# Patient Record
Sex: MALE | Race: WHITE | NOT HISPANIC OR LATINO | URBAN - METROPOLITAN AREA
[De-identification: names, ages, dates, MRNs, and addresses within clinical notes are randomized per-mention and may not be internally consistent; named-entity substitution may affect disease eponyms.]

---

## 2018-11-27 ENCOUNTER — OUTPATIENT (OUTPATIENT)
Dept: INPATIENT UNIT | Facility: HOSPITAL | Age: 73
LOS: 1 days | Discharge: HOME | End: 2018-11-27
Payer: MEDICARE

## 2018-11-27 VITALS
DIASTOLIC BLOOD PRESSURE: 87 MMHG | TEMPERATURE: 99 F | HEART RATE: 62 BPM | SYSTOLIC BLOOD PRESSURE: 153 MMHG | RESPIRATION RATE: 16 BRPM

## 2018-11-27 DIAGNOSIS — Z98.890 OTHER SPECIFIED POSTPROCEDURAL STATES: Chronic | ICD-10-CM

## 2018-11-27 DIAGNOSIS — E03.9 HYPOTHYROIDISM, UNSPECIFIED: ICD-10-CM

## 2018-11-27 DIAGNOSIS — I10 ESSENTIAL (PRIMARY) HYPERTENSION: ICD-10-CM

## 2018-11-27 DIAGNOSIS — Z85.850 PERSONAL HISTORY OF MALIGNANT NEOPLASM OF THYROID: ICD-10-CM

## 2018-11-27 DIAGNOSIS — I20.0 UNSTABLE ANGINA: ICD-10-CM

## 2018-11-27 DIAGNOSIS — E78.5 HYPERLIPIDEMIA, UNSPECIFIED: ICD-10-CM

## 2018-11-27 DIAGNOSIS — E89.0 POSTPROCEDURAL HYPOTHYROIDISM: ICD-10-CM

## 2018-11-27 DIAGNOSIS — I25.110 ATHEROSCLEROTIC HEART DISEASE OF NATIVE CORONARY ARTERY WITH UNSTABLE ANGINA PECTORIS: ICD-10-CM

## 2018-11-27 DIAGNOSIS — K21.9 GASTRO-ESOPHAGEAL REFLUX DISEASE WITHOUT ESOPHAGITIS: ICD-10-CM

## 2018-11-27 DIAGNOSIS — N18.9 CHRONIC KIDNEY DISEASE, UNSPECIFIED: ICD-10-CM

## 2018-11-27 LAB
ALBUMIN SERPL ELPH-MCNC: 4.2 G/DL — SIGNIFICANT CHANGE UP (ref 3.3–5)
ALP SERPL-CCNC: 62 U/L — SIGNIFICANT CHANGE UP (ref 40–120)
ALT FLD-CCNC: 16 U/L — SIGNIFICANT CHANGE UP (ref 10–45)
ANION GAP SERPL CALC-SCNC: 16 MMOL/L — SIGNIFICANT CHANGE UP (ref 5–17)
APTT BLD: 31.2 SEC — SIGNIFICANT CHANGE UP (ref 27.5–36.3)
AST SERPL-CCNC: 18 U/L — SIGNIFICANT CHANGE UP (ref 10–40)
BILIRUB SERPL-MCNC: 0.4 MG/DL — SIGNIFICANT CHANGE UP (ref 0.2–1.2)
BUN SERPL-MCNC: 27 MG/DL — HIGH (ref 7–23)
CALCIUM SERPL-MCNC: 9 MG/DL — SIGNIFICANT CHANGE UP (ref 8.4–10.5)
CHLORIDE SERPL-SCNC: 104 MMOL/L — SIGNIFICANT CHANGE UP (ref 96–108)
CHOLEST SERPL-MCNC: 181 MG/DL — SIGNIFICANT CHANGE UP (ref 10–199)
CO2 SERPL-SCNC: 23 MMOL/L — SIGNIFICANT CHANGE UP (ref 22–31)
CREAT SERPL-MCNC: 1.43 MG/DL — HIGH (ref 0.5–1.3)
GLUCOSE SERPL-MCNC: 81 MG/DL — SIGNIFICANT CHANGE UP (ref 70–99)
HBA1C BLD-MCNC: 5.1 % — SIGNIFICANT CHANGE UP (ref 4–5.6)
HCT VFR BLD CALC: 41.7 % — SIGNIFICANT CHANGE UP (ref 39–50)
HDLC SERPL-MCNC: 53 MG/DL — SIGNIFICANT CHANGE UP
HGB BLD-MCNC: 14.3 G/DL — SIGNIFICANT CHANGE UP (ref 13–17)
INR BLD: 0.97 — SIGNIFICANT CHANGE UP (ref 0.88–1.16)
LIPID PNL WITH DIRECT LDL SERPL: 105 MG/DL — SIGNIFICANT CHANGE UP
MAGNESIUM SERPL-MCNC: 2 MG/DL — SIGNIFICANT CHANGE UP (ref 1.6–2.6)
MCHC RBC-ENTMCNC: 28.7 PG — SIGNIFICANT CHANGE UP (ref 27–34)
MCHC RBC-ENTMCNC: 34.3 G/DL — SIGNIFICANT CHANGE UP (ref 32–36)
MCV RBC AUTO: 83.7 FL — SIGNIFICANT CHANGE UP (ref 80–100)
PLATELET # BLD AUTO: 322 K/UL — SIGNIFICANT CHANGE UP (ref 150–400)
POTASSIUM SERPL-MCNC: 4.5 MMOL/L — SIGNIFICANT CHANGE UP (ref 3.5–5.3)
POTASSIUM SERPL-SCNC: 4.5 MMOL/L — SIGNIFICANT CHANGE UP (ref 3.5–5.3)
PROT SERPL-MCNC: 7.3 G/DL — SIGNIFICANT CHANGE UP (ref 6–8.3)
PROTHROM AB SERPL-ACNC: 11 SEC — SIGNIFICANT CHANGE UP (ref 10–12.9)
RBC # BLD: 4.98 M/UL — SIGNIFICANT CHANGE UP (ref 4.2–5.8)
RBC # FLD: 12.7 % — SIGNIFICANT CHANGE UP (ref 10.3–16.9)
SODIUM SERPL-SCNC: 143 MMOL/L — SIGNIFICANT CHANGE UP (ref 135–145)
TOTAL CHOLESTEROL/HDL RATIO MEASUREMENT: 3.4 RATIO — SIGNIFICANT CHANGE UP (ref 3.4–9.6)
TRIGL SERPL-MCNC: 116 MG/DL — SIGNIFICANT CHANGE UP (ref 10–149)
TSH SERPL-MCNC: 3.82 UIU/ML — SIGNIFICANT CHANGE UP (ref 0.35–4.94)
WBC # BLD: 8.2 K/UL — SIGNIFICANT CHANGE UP (ref 3.8–10.5)
WBC # FLD AUTO: 8.2 K/UL — SIGNIFICANT CHANGE UP (ref 3.8–10.5)

## 2018-11-27 PROCEDURE — 93005 ELECTROCARDIOGRAM TRACING: CPT

## 2018-11-27 PROCEDURE — 85027 COMPLETE CBC AUTOMATED: CPT

## 2018-11-27 PROCEDURE — 85730 THROMBOPLASTIN TIME PARTIAL: CPT

## 2018-11-27 PROCEDURE — 93458 L HRT ARTERY/VENTRICLE ANGIO: CPT

## 2018-11-27 PROCEDURE — 83036 HEMOGLOBIN GLYCOSYLATED A1C: CPT

## 2018-11-27 PROCEDURE — 36415 COLL VENOUS BLD VENIPUNCTURE: CPT

## 2018-11-27 PROCEDURE — 83735 ASSAY OF MAGNESIUM: CPT

## 2018-11-27 PROCEDURE — C1769: CPT

## 2018-11-27 PROCEDURE — 93010 ELECTROCARDIOGRAM REPORT: CPT

## 2018-11-27 PROCEDURE — 93458 L HRT ARTERY/VENTRICLE ANGIO: CPT | Mod: 26

## 2018-11-27 PROCEDURE — 84443 ASSAY THYROID STIM HORMONE: CPT

## 2018-11-27 PROCEDURE — C1894: CPT

## 2018-11-27 PROCEDURE — 80061 LIPID PANEL: CPT

## 2018-11-27 PROCEDURE — 85610 PROTHROMBIN TIME: CPT

## 2018-11-27 PROCEDURE — C1887: CPT

## 2018-11-27 PROCEDURE — 80053 COMPREHEN METABOLIC PANEL: CPT

## 2018-11-27 RX ORDER — ATORVASTATIN CALCIUM 80 MG/1
1 TABLET, FILM COATED ORAL
Qty: 30 | Refills: 0 | OUTPATIENT
Start: 2018-11-27 | End: 2018-12-26

## 2018-11-27 RX ORDER — OMEPRAZOLE 10 MG/1
1 CAPSULE, DELAYED RELEASE ORAL
Qty: 0 | Refills: 0 | COMMUNITY

## 2018-11-27 RX ORDER — SIMVASTATIN 20 MG/1
1 TABLET, FILM COATED ORAL
Qty: 30 | Refills: 0 | OUTPATIENT
Start: 2018-11-27 | End: 2018-12-26

## 2018-11-27 RX ORDER — CLOPIDOGREL BISULFATE 75 MG/1
600 TABLET, FILM COATED ORAL ONCE
Qty: 0 | Refills: 0 | Status: COMPLETED | OUTPATIENT
Start: 2018-11-27 | End: 2018-11-27

## 2018-11-27 RX ORDER — ASPIRIN/CALCIUM CARB/MAGNESIUM 324 MG
243 TABLET ORAL ONCE
Qty: 0 | Refills: 0 | Status: DISCONTINUED | OUTPATIENT
Start: 2018-11-27 | End: 2018-11-27

## 2018-11-27 RX ORDER — LEVOTHYROXINE SODIUM 125 MCG
1 TABLET ORAL
Qty: 0 | Refills: 0 | COMMUNITY

## 2018-11-27 RX ORDER — ASPIRIN/CALCIUM CARB/MAGNESIUM 324 MG
1 TABLET ORAL
Qty: 0 | Refills: 0 | COMMUNITY
Start: 2018-11-27

## 2018-11-27 RX ORDER — ERGOCALCIFEROL 1.25 MG/1
1 CAPSULE ORAL
Qty: 0 | Refills: 0 | COMMUNITY

## 2018-11-27 RX ORDER — SIMVASTATIN 20 MG/1
1 TABLET, FILM COATED ORAL
Qty: 0 | Refills: 0 | COMMUNITY

## 2018-11-27 RX ORDER — FERROUS SULFATE 325(65) MG
1 TABLET ORAL
Qty: 0 | Refills: 0 | COMMUNITY

## 2018-11-27 RX ORDER — SODIUM CHLORIDE 9 MG/ML
500 INJECTION INTRAMUSCULAR; INTRAVENOUS; SUBCUTANEOUS
Qty: 0 | Refills: 0 | Status: DISCONTINUED | OUTPATIENT
Start: 2018-11-27 | End: 2018-11-27

## 2018-11-27 RX ORDER — ASPIRIN/CALCIUM CARB/MAGNESIUM 324 MG
81 TABLET ORAL DAILY
Qty: 0 | Refills: 0 | Status: DISCONTINUED | OUTPATIENT
Start: 2018-11-28 | End: 2018-11-27

## 2018-11-27 RX ORDER — LISINOPRIL 2.5 MG/1
1 TABLET ORAL
Qty: 0 | Refills: 0 | COMMUNITY

## 2018-11-27 RX ADMIN — CLOPIDOGREL BISULFATE 600 MILLIGRAM(S): 75 TABLET, FILM COATED ORAL at 18:01

## 2018-11-27 RX ADMIN — SODIUM CHLORIDE 50 MILLILITER(S): 9 INJECTION INTRAMUSCULAR; INTRAVENOUS; SUBCUTANEOUS at 18:02

## 2018-11-27 NOTE — H&P ADULT - PMH
Basal cell carcinoma    HLD (hyperlipidemia)    HTN (hypertension)    Hypothyroid    Thyroid cancer Yepez esophagus    Basal cell carcinoma    HLD (hyperlipidemia)    HTN (hypertension)    Hypothyroid    Thyroid cancer

## 2018-11-27 NOTE — H&P ADULT - PROBLEM SELECTOR PLAN 1
- Trop neg x2 at OSH  Currently ____.   -  ECG at OSH revealed NSR @ 71 BPM with 1st degree AV block and RBBB (no change to prior ECG)  - CXR at OSH revealed no acute disease  - Prior NST 7/2018 unremarkable but revealed EF 46%, f/u repeat ECHO  - f/u ECG, CXR  - Pt transferred to [facility] for cardiac cath with Dr. Jolly. Will load with Plavix 600 mg x1 and ecotrin 325 mg x1 pre cath. Currently CP free and hemodynamically stable  - Trop neg x2 at OSH  -  ECG at OSH revealed NSR @ 71 BPM with 1st degree AV block and RBBB (no change to prior ECG)  - CXR at OSH revealed no acute disease  - Prior NST 7/2018 unremarkable but revealed EF 46%, f/u repeat ECHO  - f/u ECG, CXR while in-house  - Pt transferred to [facility] for cardiac cath with Dr. Jolly. Will load with Plavix 600 mg x1 and ecotrin 243 mg x1 pre cath (pt endorses taking ASA 81 mg x1 this AM). Pre-cath consented and started on IV NS @ 50 cc/hr pre-cath. Euvolemic on exam.

## 2018-11-27 NOTE — DISCHARGE NOTE ADULT - CARE PLAN
Principal Discharge DX:	CAD (coronary artery disease)  Goal:	PLEASE FOLLOW UP  Assessment and plan of treatment:	You underwent a coronary angiogram and you were found to have a 30-50% left anterior descending artery blockage that did not require a stent. You should wait 3 days before returning to ordinary activities. Do not drive for 2 days. Consult your doctor before returning to vigorous activity. You may return to work in 3-5 days. The catheter from your wrist is removed and the gauze is in place. You can remove this gauze tomorrow and then you may shower, but avoid baths, hot tubs, or swimming for 5 days to prevent infection. If you notice bleeding from the site, hardening and pain at the site, drainage or redness from the site, coolness/paleness of the extremity, swelling, or fever, please call [phone]. Please continue your Aspirin as prescribed unless otherwise indicated by your cardiologist. If you experience any symptoms including but not limited to chest pain, shortness of breath, or dizziness, please call your doctor and seek immediate medical attention. Please follow up with  ___ and schedule an appointment in 1-2 weeks  Secondary Diagnosis:	HLD (hyperlipidemia)  Goal:	Please follow up  Assessment and plan of treatment:	Please continue simvastatin to keep your cholesterol low but please follow up with your cardiologist who will likely increase your dose or change your cholesterol lowering medication. High cholesterol contributes to heart disease.  Secondary Diagnosis:	HTN (hypertension)  Goal:	Please follow up  Assessment and plan of treatment:	Please continue medications as listed to keep your blood pressure controlled. For blood pressure that is too high or too low please see your doctor or go to the emergency room as necessary.  Secondary Diagnosis:	Hypothyroid  Goal:	Continue medication  Assessment and plan of treatment:	Continue to take your levothyroxine as prescribed.  Secondary Diagnosis:	Yepez esophagus  Goal:	Continue medication  Assessment and plan of treatment:	Continue to take your omeprazole as prescribed. Principal Discharge DX:	CAD (coronary artery disease)  Goal:	PLEASE FOLLOW UP  Assessment and plan of treatment:	You underwent a coronary angiogram and you were found to have a 30-50% left anterior descending artery blockage that did not require a stent. You should wait 3 days before returning to ordinary activities. Do not drive for 2 days. Consult your doctor before returning to vigorous activity. You may return to work in 3-5 days. The catheter from your wrist is removed and the gauze is in place. You can remove this gauze tomorrow and then you may shower, but avoid baths, hot tubs, or swimming for 5 days to prevent infection. If you notice bleeding from the site, hardening and pain at the site, drainage or redness from the site, coolness/paleness of the extremity, swelling, or fever, please call [phone]. Please continue your Aspirin as prescribed unless otherwise indicated by your cardiologist. If you experience any symptoms including but not limited to chest pain, shortness of breath, or dizziness, please call your doctor and seek immediate medical attention. PLEASE START ASPIRIN 81 MG ONCE DAILY. Please follow up with Dr. Steele and schedule an appointment in 1-2 weeks  Secondary Diagnosis:	HLD (hyperlipidemia)  Goal:	Please follow up  Assessment and plan of treatment:	Please continue simvastatin to keep your cholesterol low but please follow up with your cardiologist who will likely increase your dose or change your cholesterol lowering medication. High cholesterol contributes to heart disease.  Secondary Diagnosis:	HTN (hypertension)  Goal:	Please follow up  Assessment and plan of treatment:	Please continue medications as listed to keep your blood pressure controlled. For blood pressure that is too high or too low please see your doctor or go to the emergency room as necessary.  Secondary Diagnosis:	Hypothyroid  Goal:	Continue medication  Assessment and plan of treatment:	Continue to take your levothyroxine as prescribed.  Secondary Diagnosis:	Yepez esophagus  Goal:	Continue medication  Assessment and plan of treatment:	Continue to take your omeprazole as prescribed.

## 2018-11-27 NOTE — H&P ADULT - PROBLEM SELECTOR PLAN 4
Noted Cr 1.56, GFR 46 at OSH  - EF 46% by prior NST, f/u repeat ECHO  - Continue to monitor Noted Cr 1.56, GFR 46 at OSH  - Continue to monitor

## 2018-11-27 NOTE — H&P ADULT - PROBLEM SELECTOR PLAN 2
- c/w home lisinopril 40 mg PO QD  - Continue to monitor - c/w home lisinopril 40 mg PO QD  - Discuss addition of BB if pt with CAD.   - Continue to monitor

## 2018-11-27 NOTE — H&P ADULT - PROBLEM SELECTOR PLAN 3
- f/u lipid panel, HgbA1C  - change home simvastatin 20 mg PO QD to ____. - f/u lipid panel, HgbA1C  - c/w simvastatin 20 mg PO QD for now (home med)

## 2018-11-27 NOTE — DISCHARGE NOTE ADULT - MEDICATION SUMMARY - MEDICATIONS TO TAKE
I will START or STAY ON the medications listed below when I get home from the hospital:    lisinopril 40 mg oral tablet  -- 1 tab(s) by mouth once a day  -- Indication: For HTN (hypertension)    simvastatin 20 mg oral tablet  -- 1 tab(s) by mouth once a day (at bedtime)  -- Indication: For HLD (hyperlipidemia)    omeprazole 20 mg oral delayed release capsule  -- 1 cap(s) by mouth once a day  -- Indication: For GERD    Synthroid 150 mcg (0.15 mg) oral tablet  -- 1 tab(s) by mouth once a day (5 days per week ONLY)  -- Indication: For Hypothyroid    Multiple Vitamins oral tablet  -- 1 tab(s) by mouth once a day  -- Indication: For Supplement    Vitamin D2 2000 intl units oral capsule  -- 1 cap(s) by mouth once a day  -- Indication: For Supplement I will START or STAY ON the medications listed below when I get home from the hospital:    aspirin 81 mg oral delayed release tablet  -- 1 tab(s) by mouth once a day  -- Indication: For CAD (coronary artery disease)    lisinopril 40 mg oral tablet  -- 1 tab(s) by mouth once a day  -- Indication: For HTN (hypertension)    simvastatin 20 mg oral tablet  -- 1 tab(s) by mouth once a day (at bedtime)  -- Indication: For HLD (hyperlipidemia)    omeprazole 20 mg oral delayed release capsule  -- 1 cap(s) by mouth once a day  -- Indication: For GERD    Synthroid 150 mcg (0.15 mg) oral tablet  -- 1 tab(s) by mouth once a day (5 days per week ONLY)  -- Indication: For Hypothyroid    Multiple Vitamins oral tablet  -- 1 tab(s) by mouth once a day  -- Indication: For Supplement    Vitamin D2 2000 intl units oral capsule  -- 1 cap(s) by mouth once a day  -- Indication: For Supplement

## 2018-11-27 NOTE — DISCHARGE NOTE ADULT - PLAN OF CARE
PLEASE FOLLOW UP You underwent a coronary angiogram and you were found to have a 30-50% left anterior descending artery blockage that did not require a stent. You should wait 3 days before returning to ordinary activities. Do not drive for 2 days. Consult your doctor before returning to vigorous activity. You may return to work in 3-5 days. The catheter from your wrist is removed and the gauze is in place. You can remove this gauze tomorrow and then you may shower, but avoid baths, hot tubs, or swimming for 5 days to prevent infection. If you notice bleeding from the site, hardening and pain at the site, drainage or redness from the site, coolness/paleness of the extremity, swelling, or fever, please call [phone]. Please continue your Aspirin as prescribed unless otherwise indicated by your cardiologist. If you experience any symptoms including but not limited to chest pain, shortness of breath, or dizziness, please call your doctor and seek immediate medical attention. Please follow up with  ___ and schedule an appointment in 1-2 weeks Please follow up Please continue simvastatin to keep your cholesterol low but please follow up with your cardiologist who will likely increase your dose or change your cholesterol lowering medication. High cholesterol contributes to heart disease. Please continue medications as listed to keep your blood pressure controlled. For blood pressure that is too high or too low please see your doctor or go to the emergency room as necessary. Continue medication Continue to take your levothyroxine as prescribed. Continue to take your omeprazole as prescribed. You underwent a coronary angiogram and you were found to have a 30-50% left anterior descending artery blockage that did not require a stent. You should wait 3 days before returning to ordinary activities. Do not drive for 2 days. Consult your doctor before returning to vigorous activity. You may return to work in 3-5 days. The catheter from your wrist is removed and the gauze is in place. You can remove this gauze tomorrow and then you may shower, but avoid baths, hot tubs, or swimming for 5 days to prevent infection. If you notice bleeding from the site, hardening and pain at the site, drainage or redness from the site, coolness/paleness of the extremity, swelling, or fever, please call [phone]. Please continue your Aspirin as prescribed unless otherwise indicated by your cardiologist. If you experience any symptoms including but not limited to chest pain, shortness of breath, or dizziness, please call your doctor and seek immediate medical attention. PLEASE START ASPIRIN 81 MG ONCE DAILY. Please follow up with Dr. Steele and schedule an appointment in 1-2 weeks

## 2018-11-27 NOTE — DISCHARGE NOTE ADULT - HOSPITAL COURSE
74 yo male with PMHx of HTN,  HLD, thyroid CA s/p total thyroidectomy in 2012 (on synthroid), Yepez's esophagus s/p EGD with cauterization in 2014 who presented to [facility] with c/o non-radiating SSCP described as crushing/feeling like a knot and of severe intensity lasting 2 minutes before spontaneously resolving while he was standing, prompting him to come to ED for evaluation.  Denied further chest pain. His last NST was 4 months ago was unremarkable but did reveal EF 46% when he had a similar episode of chest pain. Denies SOB, dizziness, diaphoresis, palpitations, diaphoresis, fatigue, LE edema, PND, orthopnea, syncope, N/V, abdominal pain. At [facility], labs H/H 14.3/41.3, PT 11, INR 0.98, PTT 28.6, BUN 27, Cr 1.56, GFR 43, trop I 0.04 > 0.003. ECG revealed NSR @ 71 BPM with 1st degree AV block and RBBB (no change to prior ECG), CXR revealed no acute disease. In light of pt's risk factors,  CCS Anginal Class IV Sx, decreased EF, pt transferred to [facility] for cardiac cath with Dr. Jolly to r/o underlying CAD.     Underwent cardiac cath on 11/27/18 which revealed mLAD 30-50%, RCA mild diffuse disease, EF 60%, no AS, no MR, EDP 10, R radial.  Patient has been medically cleared for discharge as per Dr. Jolly. Patient has been given appropriate discharge instructions including medication regimen, access site management and follow up. 72 yo male with PMHx of HTN,  HLD, thyroid CA s/p total thyroidectomy in 2012 (on synthroid), Yepez's esophagus s/p EGD with cauterization in 2014 who presented to [facility] with c/o non-radiating SSCP described as crushing/feeling like a knot and of severe intensity lasting 2 minutes before spontaneously resolving while he was standing, prompting him to come to ED for evaluation.  Denied further chest pain. His last NST was 4 months ago was unremarkable but did reveal EF 46% when he had a similar episode of chest pain. Denies SOB, dizziness, diaphoresis, palpitations, diaphoresis, fatigue, LE edema, PND, orthopnea, syncope, N/V, abdominal pain. At [facility], labs H/H 14.3/41.3, PT 11, INR 0.98, PTT 28.6, BUN 27, Cr 1.56, GFR 43, trop I 0.04 > 0.003. ECG revealed NSR @ 71 BPM with 1st degree AV block and RBBB (no change to prior ECG), CXR revealed no acute disease. In light of pt's risk factors,  CCS Anginal Class IV Sx, decreased EF, pt transferred to [facility] for cardiac cath with Dr. Jolly to r/o underlying CAD.     Underwent cardiac cath on 11/27/18 which revealed mLAD 30-50%, RCA mild diffuse disease, EF 60%, no AS, no MR, EDP 10, R radial.  Patient has been medically cleared for discharge as per Dr. Jolly and referred to Dr. Steele for follow up. Patient has been given appropriate discharge instructions including medication regimen, access site management and follow up. Pt instructed to start aspirin 81 mg PO QD.

## 2018-11-27 NOTE — DISCHARGE NOTE ADULT - PATIENT PORTAL LINK FT
You can access the [facility] Patient Portal, offered by [facility], by registering with the following website: [email or web address]

## 2018-11-27 NOTE — H&P ADULT - HISTORY OF PRESENT ILLNESS
**SKELETON  **INCOMPLETE  74 yo male with PMHx of HTN,  HLD, thyroid CA s/p thyroidectomy, who presented to [facility] with c/o non-radiating SSCP described as crushing/feeling like a knot and of severe intensity lasting 2 minutes before spontaneously resolving while he was standing, prompting him to come to ED for evaluation.  Denied further chest pain. His last NST was 4 months ago was unremarkable but did reveal EF 46% when he had a similar episode of chest pain. Denies SOB, dizziness, diaphoresis, palpitations, diaphoresis, fatigue, LE edema, PND, orthopnea, syncope, N/V, abdominal pain. At [facility], labs H/H 14.3/41.3, PT 11, INR 0.98, PTT 28.6, BUN 27, Cr 1.56, GFR 43, trop I 0.04 > 0.003. ECG revealed NSR @ 71 BPM with 1st degree AV block and RBBB (no change to prior ECG), CXR revealed no acute disease. In light of pt's risk factors,  CCS Anginal Class IV Sx, decreased EF, pt transferred to [facility] for cardiac cath with Dr. Jolly to r/o underlying CAD. 72 yo male with PMHx of HTN,  HLD, thyroid CA s/p total thyroidectomy in 2012 (on synthroid), Yepez's esophagus s/p EGD with cauterization in 2014 who presented to [facility] with c/o non-radiating SSCP described as crushing/feeling like a knot and of severe intensity lasting 2 minutes before spontaneously resolving while he was standing, prompting him to come to ED for evaluation.  Denied further chest pain. His last NST was 4 months ago was unremarkable but did reveal EF 46% when he had a similar episode of chest pain. Denies SOB, dizziness, diaphoresis, palpitations, diaphoresis, fatigue, LE edema, PND, orthopnea, syncope, N/V, abdominal pain. At [facility], labs H/H 14.3/41.3, PT 11, INR 0.98, PTT 28.6, BUN 27, Cr 1.56, GFR 43, trop I 0.04 > 0.003. ECG revealed NSR @ 71 BPM with 1st degree AV block and RBBB (no change to prior ECG), CXR revealed no acute disease. In light of pt's risk factors,  CCS Anginal Class IV Sx, decreased EF, pt transferred to [facility] for cardiac cath with Dr. Jolly to r/o underlying CAD.

## 2018-11-27 NOTE — H&P ADULT - PROBLEM SELECTOR PLAN 5
h/o thyroid CA s/p thyroidectomy  - f/u TSH  - c/w synthroid 150 mcg 5 days per week.     Dispo:  - Pending cardiac cath h/o thyroid CA s/p total thyroidectomy in 2012  - f/u TSH  - c/w synthroid 150 mcg 5 days per week.     Dispo:  - Pending cardiac cath

## 2023-01-24 ENCOUNTER — APPOINTMENT (OUTPATIENT)
Dept: INTERNAL MEDICINE | Facility: CLINIC | Age: 78
End: 2023-01-24
Payer: MEDICARE

## 2023-01-24 PROCEDURE — 99213 OFFICE O/P EST LOW 20 MIN: CPT

## 2023-02-28 PROBLEM — C73 MALIGNANT NEOPLASM OF THYROID GLAND: Chronic | Status: ACTIVE | Noted: 2018-11-27

## 2023-02-28 PROBLEM — I10 ESSENTIAL (PRIMARY) HYPERTENSION: Chronic | Status: ACTIVE | Noted: 2018-11-27

## 2023-02-28 PROBLEM — K22.70 BARRETT'S ESOPHAGUS WITHOUT DYSPLASIA: Chronic | Status: ACTIVE | Noted: 2018-11-27

## 2023-02-28 PROBLEM — E03.9 HYPOTHYROIDISM, UNSPECIFIED: Chronic | Status: ACTIVE | Noted: 2018-11-27

## 2023-02-28 PROBLEM — E78.5 HYPERLIPIDEMIA, UNSPECIFIED: Chronic | Status: ACTIVE | Noted: 2018-11-27

## 2023-02-28 PROBLEM — C44.91 BASAL CELL CARCINOMA OF SKIN, UNSPECIFIED: Chronic | Status: ACTIVE | Noted: 2018-11-27

## 2023-03-06 ENCOUNTER — TRANSCRIPTION ENCOUNTER (OUTPATIENT)
Age: 78
End: 2023-03-06

## 2023-03-23 PROBLEM — Z00.00 ENCOUNTER FOR PREVENTIVE HEALTH EXAMINATION: Status: ACTIVE | Noted: 2023-03-23
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
